# Patient Record
Sex: MALE | Race: WHITE | URBAN - METROPOLITAN AREA
[De-identification: names, ages, dates, MRNs, and addresses within clinical notes are randomized per-mention and may not be internally consistent; named-entity substitution may affect disease eponyms.]

---

## 2018-12-29 ENCOUNTER — OFFICE VISIT (OUTPATIENT)
Dept: URGENT CARE | Facility: URGENT CARE | Age: 48
End: 2018-12-29
Payer: COMMERCIAL

## 2018-12-29 VITALS
WEIGHT: 197.2 LBS | DIASTOLIC BLOOD PRESSURE: 70 MMHG | OXYGEN SATURATION: 93 % | HEART RATE: 85 BPM | TEMPERATURE: 99.3 F | SYSTOLIC BLOOD PRESSURE: 122 MMHG

## 2018-12-29 DIAGNOSIS — R50.9 FEVER IN ADULT: ICD-10-CM

## 2018-12-29 DIAGNOSIS — R19.7 VOMITING AND DIARRHEA: ICD-10-CM

## 2018-12-29 DIAGNOSIS — E86.0 DEHYDRATION: ICD-10-CM

## 2018-12-29 DIAGNOSIS — R11.10 VOMITING AND DIARRHEA: ICD-10-CM

## 2018-12-29 DIAGNOSIS — R11.2 NAUSEA AND VOMITING, INTRACTABILITY OF VOMITING NOT SPECIFIED, UNSPECIFIED VOMITING TYPE: Primary | ICD-10-CM

## 2018-12-29 LAB
ANION GAP SERPL CALCULATED.3IONS-SCNC: 8 MMOL/L (ref 3–14)
BUN SERPL-MCNC: 15 MG/DL (ref 7–30)
CALCIUM SERPL-MCNC: 8.8 MG/DL (ref 8.5–10.1)
CHLORIDE SERPL-SCNC: 106 MMOL/L (ref 94–109)
CO2 SERPL-SCNC: 21 MMOL/L (ref 20–32)
CREAT SERPL-MCNC: 0.88 MG/DL (ref 0.66–1.25)
GFR SERPL CREATININE-BSD FRML MDRD: >90 ML/MIN/{1.73_M2}
GLUCOSE SERPL-MCNC: 111 MG/DL (ref 70–99)
POTASSIUM SERPL-SCNC: 3.8 MMOL/L (ref 3.4–5.3)
SODIUM SERPL-SCNC: 135 MMOL/L (ref 133–144)

## 2018-12-29 PROCEDURE — 80048 BASIC METABOLIC PNL TOTAL CA: CPT | Performed by: FAMILY MEDICINE

## 2018-12-29 PROCEDURE — 36415 COLL VENOUS BLD VENIPUNCTURE: CPT | Performed by: FAMILY MEDICINE

## 2018-12-29 PROCEDURE — 99203 OFFICE O/P NEW LOW 30 MIN: CPT | Performed by: FAMILY MEDICINE

## 2018-12-29 RX ORDER — ONDANSETRON 4 MG/1
4 TABLET, ORALLY DISINTEGRATING ORAL ONCE
Status: COMPLETED | OUTPATIENT
Start: 2018-12-29 | End: 2018-12-29

## 2018-12-29 RX ORDER — ONDANSETRON 4 MG/1
4 TABLET, FILM COATED ORAL EVERY 8 HOURS PRN
Qty: 15 TABLET | Refills: 0 | Status: SHIPPED | OUTPATIENT
Start: 2018-12-29 | End: 2019-01-05

## 2018-12-29 RX ADMIN — ONDANSETRON 4 MG: 4 TABLET, ORALLY DISINTEGRATING ORAL at 15:56

## 2018-12-29 NOTE — PROGRESS NOTES
SUBJECTIVE:  Chief Complaint   Patient presents with     Urgent Care     Pt states abdominal pain, burning sensation, not hungry, nasuea, vomiting, diarreah 3x days      Stanley Hutson is a 48 year old male whose symptoms began 3 days ago and include cramping, vomiting and diarrhea.    Symptoms are sudden onset and moderate.    Aggravating factors: nothing.    Alleviating factors:nothing  Associated symptoms:  Pain:Pain Location:  periumbilical and lower abdomen  Fever: low grade fevers  Diarrhea:  consists of 5 stools/day and is persisting  Stools: a few loose stools  Appetite: decreased  Risk factors: possible bad food exposure    Past Medical History:   Diagnosis Date     Uncomplicated asthma    .  Current Outpatient Medications   Medication Sig Dispense Refill     ondansetron (ZOFRAN) 4 MG tablet Take 1 tablet (4 mg) by mouth every 8 hours as needed for nausea 15 tablet 0     albuterol (PROAIR HFA, PROVENTIL HFA, VENTOLIN HFA) 108 (90 BASE) MCG/ACT inhaler Inhale 2 puffs into the lungs every 6 hours       Social History     Tobacco Use     Smoking status: Not on file   Substance Use Topics     Alcohol use: Not on file       ROS:  10 point ROS of systems including Constitutional, Eyes, Respiratory, Cardiovascular,  Genitourinary, Integumentary, Muscularskeletal, Psychiatric were all negative except for pertinent positives noted in my HPI           OBJECTIVE:  /70   Pulse 85   Temp 99.3  F (37.4  C) (Tympanic)   Wt 89.4 kg (197 lb 3.2 oz)   SpO2 93%   GENERAL APPEARANCE: healthy, alert and no distress  EYES: EOMI,  PERRL, conjunctiva clear  HENT: ear canals and TM's normal.  Nose and mouth without ulcers, erythema or lesions- oral mucosa dry   NECK: supple, nontender, no lymphadenopathy  RESP: lungs clear to auscultation - no rales, rhonchi or wheezes  CV: regular rates and rhythm, normal S1 S2, no murmur noted  ABDOMEN:  soft, nontender, no HSM or masses and bowel sounds normal  SKIN: no suspicious  lesions or rashes    ASSESSMENT:  Encounter Diagnoses   Name Primary?     Nausea and vomiting, intractability of vomiting not specified, unspecified vomiting type Yes     Vomiting and diarrhea      Fever in adult      Stanley was seen today for urgent care.    Diagnoses and all orders for this visit:    Nausea and vomiting, intractability of vomiting not specified, unspecified vomiting type  -     ondansetron (ZOFRAN-ODT) ODT tab 4 mg; Take 1 tablet (4 mg) by mouth once  -     Basic metabolic panel  -     ondansetron (ZOFRAN) 4 MG tablet; Take 1 tablet (4 mg) by mouth every 8 hours as needed for nausea    Vomiting and diarrhea  -     Enteric Bacteria and Virus Panel by GAUT[facility] Stool; Future    Fever in adult    Dehydration        PLAN:  Diet: small amounts clear fluids frequently, Pedialyte, dilute gatorade, soups and small amounts clear fluids frequently,soups,juices,water,advance diet as tolerated  See EPIC for orders  Labs WNL  Discussed with pt signs of dehydration   Follow up if  symptoms fail to improve or worsens   Pt understood and agreed with plan     Debby Stuart MD         Follow-up with PCP if not improving

## 2018-12-29 NOTE — PATIENT INSTRUCTIONS
"  Patient Education     Vomiting (Adult)  Vomiting is a common symptom that may be due to different causes. These include gastroenteritis (\"stomach flu\"), food poisoning and gastritis. There are other more serious causes of vomiting which may be hard to diagnose early in the illness. Therefore, it is important to watch for the warning signs listed below.  The main danger from repeated vomiting is dehydration. This is due to excess loss of water and minerals from the body. When this occurs, your body fluids must be replaced.  Home care    If symptoms are severe, rest at home for the next 24 hours.    Because your symptoms may be from an infection, wash your hands often and well. If soap and water are not available, use alcohol-based  to keep from spreading the infection to others.    Wash your hands for at least 20 seconds. Humming the happy birthday song twice while you wash is an easy way to make sure you've washed for 20 seconds.    Wash your hands after using the toilet, before and after preparing food, before eating food, after changing a diaper, cleaning a wound, caring for a sick person, and blowing your nose, coughing, or sneezing. You should also wash your hands after caring for someone who is sick, touching pet food, or treats, and touching an animal, or animal waste.    You may use acetaminophen or NSAID medicines like ibuprofen or naproxen to control fever, unless another medicine was prescribed. If you have chronic liver or kidney disease or ever had a stomach ulcer or gastrointestinal bleeding, talk with your doctor before using these medicines. Aspirin should never be used in anyone under 18 years of age who is ill with a fever. It may cause severe liver damage. Don't use NSAID medicines if you are already taking one for another condition (like arthritis) or are on aspirin (such as for heart disease, or after a stroke)    Don't use tobacco and or drink alcohol, which may worsen your " symptoms.    If medicines for vomiting were prescribed, take as directed.    Once vomiting stops, then follow these guidelines:  During the first 12 to 24 hours follow the diet below:    Fruit juices. Apple, grape juice, clear fruit drinks, and electrolyte replacement drinks.    Beverages. Soft drinks without caffeine; mineral water (plain or flavored), decaffeinated tea and coffee.    Soups. Clear broth and bouillon    Desserts. Plain gelatin, ice pops, and fruit juice bars. As you feel better, you may add 6 to 8 ounces of yogurt per day.  During the next 24 hours you may add the following to the above:    Hot cereal, plain toast, bread, rolls, crackers    Plain noodles, rice, mashed potatoes, chicken noodle or rice soup    Unsweetened canned fruit such as applesauce, bananas (avoid pineapple and citrus)    Limit caffeine and chocolate. No spices or seasonings except salt.  During the next 24 hours:  Gradually resume a normal diet, as you feel better and your symptoms lessen.  Follow-up care  Follow up with your healthcare provider, or as advised.  When to seek medical advice  Call your healthcare provider right away if any of these occur:    Constant right-sided lower belly pain or increasing general belly pain    Continued vomiting (unable to keep liquids down) for 24 hours    Vomiting blood or coffee grounds    Swollen belly    Frequent diarrhea (more than 5 times a day); blood (red or black color) or mucus in diarrhea    Reduced urine output or extreme thirst    Weakness, dizziness or fainting    Unusually drowsy or confused    Fever of 100.4 F (38 C) oral or higher, or as directed    Yellow color of the eyes or skin  Date Last Reviewed: 3/1/2018    3605-0953 [employer]. [address] [place] [address], [place] [address]. All rights reserved. This information is not intended as a substitute for professional medical care. Always follow your healthcare professional's instructions.           Patient  Education     Self-Care for Vomiting and Diarrhea    Vomiting and diarrhea can make you miserable. Your stomach and bowels are reacting to an irritant. This might be food, medicine, or viral stomach flu. Vomiting and diarrhea are two ways your body can remove the problem from your system. Nausea is a symptom that discourages you from eating. This gives your stomach and bowels time to recover. To get back to normal, start with self-care to ease your discomfort.  Drink liquids  Drink or sip liquids to avoid losing too much fluid (dehydration):    Clear liquids such as water or broth are the best choices.    Do not drink beverages with a lot of sugar in them, such as juices and sodas. These can make diarrhea worse.    If you have severe vomiting, do not drink sport drinks, such as electrolyte solutions. These don't have the right mix of water, sugar, and minerals. They can also make the symptoms worse. In this situation, commercially available oral rehydration solutions are best.     Suck on ice chips if the thought of drinking something makes you queasy.  When you re able to eat again  Tips include the following:    As your appetite comes back, you can resume your normal diet.    Ask your healthcare provider whether you should stay away from any foods.  Medicines  Know the following about medicines:    Vomiting and diarrhea are ways your body uses to rid itself of harmful substances such as bacteria. DO NOT use antidiarrheal or antivomiting (antiemetic) medicines unless your healthcare provider tells you to do so.    Aspirin, medicine with aspirin, and many aspirin substitutes can irritate your stomach. So avoid them when you have stomach upset.    Certain prescription and over-the-counter medicines can cause vomiting and diarrhea. Talk with your healthcare provider about any medicines you take that may be causing these symptoms.    Certain over-the-counter antihistamines can help control nausea. Other medicines can  help soothe stomach upset. Ask your healthcare provider which medicines may help you.  When to call your healthcare provider  Call your healthcare provider if you have:    Bloody or black vomit or stools    Severe, steady belly pain    Vomiting with a severe headache or vomiting after a head injury    Vomiting and diarrhea together for more than an hour    An inability to hold down even sips of liquids for more than 12 hours    Vomiting that lasts more than 24 hours    Severe diarrhea that lasts more than 2 days    Yellowish color to your skin or the whites of your eyes    Inability to urinate. In infants and young children, not making wet diapers.    Date Last Reviewed: 6/1/2016 2000-2018 [employer]. [address] [place] [address], [place] [address]. All rights reserved. This information is not intended as a substitute for professional medical care. Always follow your healthcare professional's instructions.

## 2021-05-26 ENCOUNTER — RECORDS - HEALTHEAST (OUTPATIENT)
Dept: ADMINISTRATIVE | Facility: CLINIC | Age: 51
End: 2021-05-26